# Patient Record
Sex: MALE | Race: WHITE | NOT HISPANIC OR LATINO | URBAN - NONMETROPOLITAN AREA
[De-identification: names, ages, dates, MRNs, and addresses within clinical notes are randomized per-mention and may not be internally consistent; named-entity substitution may affect disease eponyms.]

---

## 2023-09-01 ENCOUNTER — TRANSCRIBE ORDERS (OUTPATIENT)
Dept: ADMINISTRATIVE | Facility: HOSPITAL | Age: 38
End: 2023-09-01

## 2023-09-01 ENCOUNTER — LAB (OUTPATIENT)
Dept: LAB | Facility: HOSPITAL | Age: 38
End: 2023-09-01

## 2023-09-01 DIAGNOSIS — N46.9 MALE INFERTILITY, UNSPECIFIED: ICD-10-CM

## 2023-09-01 DIAGNOSIS — N46.9 MALE INFERTILITY, UNSPECIFIED: Primary | ICD-10-CM

## 2023-09-01 LAB
CHARACTER SMN: ABNORMAL
COLOR SMN: ABNORMAL
FORWARD PROGRESSION: ABNORMAL
ROUND CELLS, SEMINAL FLUID: ABNORMAL /HPF
SPECIMEN VOL SMN: 2.5 ML (ref 2–5)
SPERM # SMN: 22.5 MILLIONS/ML (ref 20–999)
SPERM MOTILE NFR SMN: 35 % MOTILE (ref 41–100)
SPERM PRECURSORS/100 SPERM: 2 /100 SPERM
SPERM SMN: 20 % NORMAL (ref 30–100)
VIABLE CELLS NFR SPEC: ABNORMAL %
VISC SMN: ABNORMAL CP
WBC/100 SPERM: 3 /100 SPERM

## 2023-09-01 PROCEDURE — 89320 SEMEN ANAL VOL/COUNT/MOT: CPT

## 2023-09-11 ENCOUNTER — OFFICE VISIT (OUTPATIENT)
Dept: UROLOGY | Facility: CLINIC | Age: 38
End: 2023-09-11

## 2023-09-11 VITALS
WEIGHT: 199.6 LBS | BODY MASS INDEX: 27.04 KG/M2 | DIASTOLIC BLOOD PRESSURE: 78 MMHG | HEART RATE: 85 BPM | SYSTOLIC BLOOD PRESSURE: 128 MMHG | HEIGHT: 72 IN

## 2023-09-11 DIAGNOSIS — N46.9 INFERTILITY MALE: Primary | ICD-10-CM

## 2023-09-11 PROCEDURE — 83002 ASSAY OF GONADOTROPIN (LH): CPT

## 2023-09-11 PROCEDURE — 84403 ASSAY OF TOTAL TESTOSTERONE: CPT

## 2023-09-11 PROCEDURE — 99203 OFFICE O/P NEW LOW 30 MIN: CPT

## 2023-09-11 PROCEDURE — 83001 ASSAY OF GONADOTROPIN (FSH): CPT

## 2023-09-11 PROCEDURE — 1160F RVW MEDS BY RX/DR IN RCRD: CPT

## 2023-09-11 PROCEDURE — 1159F MED LIST DOCD IN RCRD: CPT

## 2023-09-11 RX ORDER — BUPRENORPHINE HYDROCHLORIDE AND NALOXONE HYDROCHLORIDE DIHYDRATE 8; 2 MG/1; MG/1
1 TABLET SUBLINGUAL DAILY
COMMUNITY

## 2023-09-11 NOTE — PROGRESS NOTES
"Chief Complaint:    Chief Complaint   Patient presents with    Fertility        Vital Signs:   /78 (BP Location: Right arm, Patient Position: Sitting)   Pulse 85   Ht 182.9 cm (72\")   Wt 90.5 kg (199 lb 9.6 oz)   BMI 27.07 kg/m²   Body mass index is 27.07 kg/m².      HPI:  Loco Reyes is a 37 y.o. male who presents today for initial evaluation     History of Present Illness  Mr. Reyes presents to the clinic for initial evaluation of fertility issues.  He has a past medical history for polysubstance drug abuse and is currently on Suboxone daily.  He has never fathered any children in the past.  He reports he had a testosterone completed roughly a month ago that was low at around 150.  He had a recent semen analysis on 9/8/2023 that showed a total sperm count around 22 million.  He also had decreased viscosity, motility, and sperm morphology.  Physical exam today is normal.  He denies any headaches, blurry vision, double vision, vision changes, dry ejaculation, or other abnormalities.  He and his partner report they have been trying to conceive for roughly 1 year with no success.  She has been worked up and states she was cleared by her OB/GYN.    Past Medical History:  History reviewed. No pertinent past medical history.    Current Meds:  Current Outpatient Medications   Medication Sig Dispense Refill    buprenorphine-naloxone (SUBOXONE) 8-2 MG per SL tablet Place 1 tablet under the tongue Daily.       No current facility-administered medications for this visit.        Allergies:   Allergies   Allergen Reactions    Penicillins Other (See Comments)     Pt is unaware of allergic react         Past Surgical History:  History reviewed. No pertinent surgical history.    Social History:  Social History     Socioeconomic History    Marital status: Unknown   Tobacco Use    Smoking status: Every Day     Packs/day: 0.50     Types: Cigarettes    Smokeless tobacco: Current   Vaping Use    Vaping Use: Never used "   Substance and Sexual Activity    Alcohol use: Never    Drug use: Never    Sexual activity: Never       Family History:  Family History   Problem Relation Age of Onset    Hypertension Father        Review of Systems:  Review of Systems   Constitutional:  Negative for chills, fatigue and fever.   HENT: Negative.     Eyes: Negative.    Respiratory:  Negative for cough, shortness of breath and wheezing.    Cardiovascular:  Negative for leg swelling.   Gastrointestinal:  Negative for abdominal pain, nausea and vomiting.   Endocrine: Negative.    Genitourinary:  Negative for difficulty urinating, dysuria, frequency, penile swelling, scrotal swelling, testicular pain and urgency.   Musculoskeletal:  Negative for back pain and joint swelling.   Skin: Negative.    Allergic/Immunologic: Negative.    Neurological:  Negative for dizziness and headaches.   Hematological: Negative.    Psychiatric/Behavioral:  Negative for confusion.      Physical Exam:  Physical Exam  Constitutional:       General: He is not in acute distress.     Appearance: Normal appearance.   HENT:      Head: Normocephalic and atraumatic.      Nose: Nose normal.      Mouth/Throat:      Mouth: Mucous membranes are moist.   Eyes:      Conjunctiva/sclera: Conjunctivae normal.   Cardiovascular:      Rate and Rhythm: Normal rate and regular rhythm.      Pulses: Normal pulses.      Heart sounds: Normal heart sounds.   Pulmonary:      Effort: Pulmonary effort is normal.      Breath sounds: Normal breath sounds.   Abdominal:      General: Bowel sounds are normal.      Palpations: Abdomen is soft.   Genitourinary:     Penis: Normal.       Testes: Normal.   Musculoskeletal:         General: Normal range of motion.      Cervical back: Normal range of motion.   Skin:     General: Skin is warm.   Neurological:      General: No focal deficit present.      Mental Status: He is alert and oriented to person, place, and time.   Psychiatric:         Mood and Affect: Mood  normal.         Behavior: Behavior normal.         Thought Content: Thought content normal.         Judgment: Judgment normal.               Recent Image (CT and/or KUB):   CT Abdomen and Pelvis: No results found for this or any previous visit.     CT Stone Protocol: No results found for this or any previous visit.     KUB: No results found for this or any previous visit.       Labs:  Brief Urine Lab Results       None          Lab on 09/01/2023   Component Date Value Ref Range Status    Semen Color 09/01/2023 Straw (A)  White, Joel Final    Volume, Semen 09/01/2023 2.5  2.0 - 5.0 mL Final    Viscosity 09/01/2023 Minor (A)  Normal Final    Motility 09/01/2023 35 (L)  41 - 100 % Motile Final    Forward Progression 09/01/2023 2.5 (Normal) The majority of the motile sperm have good, normal rapid forward progression.   Final    Viability 09/01/2023    Final    Not performed per protocol    Sperm Morphology 09/01/2023 20.0 (L)  30.0 - 100.0 % Normal Final    Sperm, Total Count 09/01/2023 22.5  20.0 - 999.0 Millions/mL Final    Round Cells, Seminal Fluid 09/01/2023 0-7  0 - 7 /HPF Final    WBC/100 sperm 09/01/2023 3  /100 Sperm Final    Sperm Precursors/100 sperm 09/01/2023 2  /100 Sperm Final    Character, Semen 09/01/2023 Translucent but turbid  Translucent but turbid Final        Procedure: None  Procedures     I have reviewed and agree with the above PMH, PSH, FMH, social history, medications, allergies, and labs.     Assessment/Plan:   Problem List Items Addressed This Visit          Genitourinary and Reproductive     Infertility male - Primary    Relevant Orders    Follicle Stimulating Hormone    Luteinizing Hormone    Testosterone       Health Maintenance:   Health Maintenance Due   Topic Date Due    BMI FOLLOWUP  Never done    COVID-19 Vaccine (1) Never done    Pneumococcal Vaccine 0-64 (1 - PCV) Never done    TDAP/TD VACCINES (1 - Tdap) Never done    HEPATITIS C SCREENING  Never done    ANNUAL PHYSICAL  Never  done        Smoking Counseling: Current everyday smoker.  Current user of smokeless tobacco.  Counseling given however not ready to quit at this time.    Urine Incontinence: Patient reports that he is not currently experiencing any symptoms of urinary incontinence.    Patient was given instructions and counseling regarding his condition or for health maintenance advice. Please see specific information pulled into the AVS if appropriate.    Patient Education:   Male infertility -discussed with the patient the pathophysiology of male infertility.  Discussed causes which can include but are not limited to primary versus secondary hypogonadism.  Discussed work-up including lab test results for testosterone, FSH, LH, and other hormone/blood work.  Did discuss treatments which can include medications versus therapy such as in vitro fertilization.  Given patient's normal sperm count with low motility and viscosity and recommending to increase water and fruits and vegetables daily.  Also discussed with the patient using Mucinex low-dose once daily for spur motility and viscosity.  Discussed the risk and benefits of this medication.  Also discussed the use of Clomid and off label setting to help treat low testosterone.  Discussed the risk and benefits of this medication.  Advised him he may experience hot flashes, mood swings, breast tenderness/enlargement, abdominal cramps, and night sweats.  This time I will obtain FSH, LH, and testosterone.  I will call patient with results.  If test results show low testosterone we will start him on Clomid or obtain an MRI if he has abnormal FSH and LH.  Otherwise we will see the patient back in roughly 3 months for repeat lab work.  Patient verbalized understanding and agreed to plan of care.    Visit Diagnoses:    ICD-10-CM ICD-9-CM   1. Infertility male  N46.9 606.9       Meds Ordered During Visit:  No orders of the defined types were placed in this encounter.      Follow Up  Appointment: 3 months  No follow-ups on file.      This document has been electronically signed by Ash Nice PA-C   September 11, 2023 15:18 EDT    Part of this note may be an electronic transcription/translation of spoken language to printed text using the Dragon Dictation System.

## 2023-09-12 ENCOUNTER — TELEPHONE (OUTPATIENT)
Dept: UROLOGY | Facility: CLINIC | Age: 38
End: 2023-09-12

## 2023-09-12 DIAGNOSIS — N46.9 INFERTILITY MALE: Primary | ICD-10-CM

## 2023-09-12 LAB
FSH SERPL-ACNC: 2.17 MIU/ML
LH SERPL-ACNC: 2.12 MIU/ML
TESTOST SERPL-MCNC: 227 NG/DL (ref 249–836)

## 2023-09-12 NOTE — TELEPHONE ENCOUNTER
Tried to call Mr. Reyes however no one answered and voicemail box was not set up.  I did go ahead and send in Clomid 50 mg once daily for his low testosterone and infertility.

## 2023-09-14 ENCOUNTER — TELEPHONE (OUTPATIENT)
Dept: UROLOGY | Facility: CLINIC | Age: 38
End: 2023-09-14

## 2023-09-14 ENCOUNTER — PATIENT ROUNDING (BHMG ONLY) (OUTPATIENT)
Dept: UROLOGY | Facility: CLINIC | Age: 38
End: 2023-09-14

## 2023-09-14 NOTE — TELEPHONE ENCOUNTER
Caller: Loco Reyes    Relationship: Self    Best call back number: [phone]    What medications are you currently taking:   Current Outpatient Medications on File Prior to Visit   Medication Sig Dispense Refill    buprenorphine-naloxone (SUBOXONE) 8-2 MG per SL tablet Place 1 tablet under the tongue Daily.      clomiPHENE (CLOMID) 50 MG tablet Take 1 tablet by mouth Daily. 30 tablet 2     No current facility-administered medications on file prior to visit.        When did you start taking these medications:  2 DAYS    Which medication are you concerned about: CLOMID 50 MG TABLET DAILY    Who prescribed you this medication: AJIT MONDRAGON    What are your concerns: RECEIVED A CALL FROM ЕКАТЕРИНА MENDEZ. ON THE  VERBAL. PT DID NOT CHECK OFF WHAT INFORMATION  CAN RELEASE TO ЕКАТЕРИНА MENDEZ ( VERBAL NEEDS TO BE UPDATED). SHE CALLED BECAUSE THE MEDICATION CLOMID 50 MG DAILY IS CAUSING HIM DIZZINESS AND WANTED TO KNOW IF HE CAN TAKE HALF A DAY?     How long have you had these concerns:

## 2024-01-22 ENCOUNTER — TELEPHONE (OUTPATIENT)
Dept: UROLOGY | Facility: CLINIC | Age: 39
End: 2024-01-22

## 2024-01-22 DIAGNOSIS — N46.9 INFERTILITY MALE: ICD-10-CM

## 2024-01-23 RX ORDER — TRIAMCINOLONE ACETONIDE 1 MG/G
50 CREAM TOPICAL DAILY
Qty: 30 TABLET | Refills: 0 | OUTPATIENT
Start: 2024-01-23

## 2024-02-09 ENCOUNTER — OFFICE VISIT (OUTPATIENT)
Dept: UROLOGY | Facility: CLINIC | Age: 39
End: 2024-02-09
Payer: COMMERCIAL

## 2024-02-09 VITALS
HEIGHT: 72 IN | BODY MASS INDEX: 28.39 KG/M2 | SYSTOLIC BLOOD PRESSURE: 151 MMHG | DIASTOLIC BLOOD PRESSURE: 92 MMHG | WEIGHT: 209.6 LBS | HEART RATE: 87 BPM

## 2024-02-09 DIAGNOSIS — N46.9 INFERTILITY MALE: Primary | ICD-10-CM

## 2024-02-09 DIAGNOSIS — E34.8 HYPERESTROGENISM IN MALE: ICD-10-CM

## 2024-02-09 DIAGNOSIS — K59.04 CHRONIC IDIOPATHIC CONSTIPATION: ICD-10-CM

## 2024-02-09 PROCEDURE — 82670 ASSAY OF TOTAL ESTRADIOL: CPT

## 2024-02-09 PROCEDURE — 84403 ASSAY OF TOTAL TESTOSTERONE: CPT

## 2024-02-09 RX ORDER — DOCUSATE SODIUM 100 MG/1
100 CAPSULE, LIQUID FILLED ORAL 2 TIMES DAILY
Qty: 60 CAPSULE | Refills: 1 | Status: SHIPPED | OUTPATIENT
Start: 2024-02-09

## 2024-02-09 NOTE — PROGRESS NOTES
"Chief Complaint:    Chief Complaint   Patient presents with    Infertility     Follow up        Vital Signs:   /92   Pulse 87   Ht 182.9 cm (72\")   Wt 95.1 kg (209 lb 9.6 oz)   BMI 28.43 kg/m²   Body mass index is 28.43 kg/m².      HPI:  Loco Reyes is a 38 y.o. male who presents today for follow up    History of Present Illness  Mr. Corado presents to the clinic with his significant other for evaluation of infertility.  Patient has a past medical history significant for substance abuse and is currently on Suboxone.  They have been trying to conceive for several years now with no success.  Last office visit patient had a testosterone that was significantly low at 227.  He had a LH and FSH that came back normal.  Semen analysis completed at last office visit showed roughly 22 million sperm with a decreased motility and morphology.  Patient was started on Clomid 50 mg daily and states he had an overall improvement in symptoms consistent with low testosterone.  Patient reports an improvement in sex drive, fatigue, erections, and states he has had some weight loss.  Patient states that has not had his medication in roughly 2 months due to a lapse in insurance.  He wishes to try to restart Clomid at this time.  I will check a testosterone and estradiol in office and have the patient repeat his semen analysis.  Infertility  Pertinent negatives include no abdominal pain, chest pain, chills, congestion, fatigue, fever, headaches, nausea, neck pain, rash or vomiting.       Past Medical History:  No past medical history on file.    Current Meds:  Current Outpatient Medications   Medication Sig Dispense Refill    buprenorphine-naloxone (SUBOXONE) 8-2 MG per SL tablet Place 1 tablet under the tongue Daily.      clomiPHENE (CLOMID) 50 MG tablet Take 1 tablet by mouth Daily. 30 tablet 3    docusate sodium (Colace) 100 MG capsule Take 1 capsule by mouth 2 (Two) Times a Day. 60 capsule 1     No current " facility-administered medications for this visit.        Allergies:   Allergies   Allergen Reactions    Penicillins Other (See Comments)     Pt is unaware of allergic react         Past Surgical History:  No past surgical history on file.    Social History:  Social History     Socioeconomic History    Marital status: Significant Other   Tobacco Use    Smoking status: Every Day     Packs/day: .5     Types: Cigarettes    Smokeless tobacco: Current   Vaping Use    Vaping Use: Never used   Substance and Sexual Activity    Alcohol use: Never    Drug use: Never    Sexual activity: Never       Family History:  Family History   Problem Relation Age of Onset    Hypertension Father        Review of Systems:  Review of Systems   Constitutional:  Negative for chills, fatigue, fever and unexpected weight change.   HENT:  Negative for congestion and sinus pressure.    Respiratory:  Negative for chest tightness and shortness of breath.    Cardiovascular:  Negative for chest pain.   Gastrointestinal:  Negative for abdominal pain, constipation, diarrhea, nausea and vomiting.   Genitourinary:  Negative for difficulty urinating, dysuria, flank pain, frequency, hematuria and urgency.   Musculoskeletal:  Positive for back pain. Negative for neck pain.   Skin:  Negative for rash.   Neurological:  Negative for dizziness and headaches.   Hematological:  Does not bruise/bleed easily.   Psychiatric/Behavioral:  Negative for confusion and suicidal ideas. The patient is not nervous/anxious.        Physical Exam:  Physical Exam  Constitutional:       General: He is not in acute distress.     Appearance: Normal appearance.   HENT:      Head: Normocephalic and atraumatic.      Nose: Nose normal.      Mouth/Throat:      Mouth: Mucous membranes are moist.   Eyes:      Conjunctiva/sclera: Conjunctivae normal.   Cardiovascular:      Rate and Rhythm: Normal rate and regular rhythm.      Pulses: Normal pulses.      Heart sounds: Normal heart sounds.    Pulmonary:      Effort: Pulmonary effort is normal.      Breath sounds: Normal breath sounds.   Abdominal:      General: Bowel sounds are normal.      Palpations: Abdomen is soft.   Genitourinary:     Penis: Normal.       Testes: Normal.   Musculoskeletal:         General: Normal range of motion.      Cervical back: Normal range of motion.   Skin:     General: Skin is warm.   Neurological:      General: No focal deficit present.      Mental Status: He is alert and oriented to person, place, and time.   Psychiatric:         Mood and Affect: Mood normal.         Behavior: Behavior normal.         Thought Content: Thought content normal.         Judgment: Judgment normal.        Recent Image (CT and/or KUB):   CT Abdomen and Pelvis: No results found for this or any previous visit.     CT Stone Protocol: No results found for this or any previous visit.     KUB: No results found for this or any previous visit.       Labs:  Brief Urine Lab Results       None          No visits with results within 3 Month(s) from this visit.   Latest known visit with results is:   Office Visit on 09/11/2023   Component Date Value Ref Range Status    FSH 09/11/2023 2.17  mIU/mL Final    LH 09/11/2023 2.12  mIU/mL Final    Testosterone, Total 09/11/2023 227.00 (L)  249.00 - 836.00 ng/dL Final        Procedure: None  Procedures     I have reviewed and agree with the above PMH, PSH, FMH, social history, medications, allergies, and labs.     Assessment/Plan:   Problem List Items Addressed This Visit          Genitourinary and Reproductive     Infertility male - Primary    Relevant Medications    clomiPHENE (CLOMID) 50 MG tablet    Other Relevant Orders    Testosterone    Semen Analysis - Semen, Voided     Other Visit Diagnoses       Hyperestrogenism in male        Relevant Orders    Estradiol    Chronic idiopathic constipation        Relevant Medications    docusate sodium (Colace) 100 MG capsule            Health Maintenance:   Health  Maintenance Due   Topic Date Due    BMI FOLLOWUP  Never done    COVID-19 Vaccine (1) Never done    Pneumococcal Vaccine 0-64 (1 of 2 - PCV) Never done    TDAP/TD VACCINES (1 - Tdap) Never done    INFLUENZA VACCINE  Never done    HEPATITIS C SCREENING  Never done    ANNUAL PHYSICAL  Never done        Smoking Counseling: Everyday smoker.  Current user of smokeless tobacco.  Counseling given however not ready to quit at this time.    Urine Incontinence: Patient reports that he is not currently experiencing any symptoms of urinary incontinence.    Patient was given instructions and counseling regarding his condition or for health maintenance advice. Please see specific information pulled into the AVS if appropriate.    Patient Education:   Male infertility -patient had a low testosterone and abnormal semen analysis at the last office visit.  He was started on Clomid with improvement in symptoms.  He wishes to continue medication at this time.  I did discuss the risk and benefits of Clomid.  Vies patient this is an off label setting and not FDA approved.  Did discuss the uses of other conservative treatment methods such as increasing water intake 2 to 3 L/day to help with viscosity and volume.  Advised patient to also start Mucinex to help with motility, viability, and morphology.  Discussed the risk and benefits of this as well.  I would like to complete a testosterone and estradiol in office today.  Will have the patient complete another semen analysis is soon as possible.  Otherwise we will refill the patient's Clomid and see him back in 3 months for reevaluation.  Hyperestrogenism and male -discussed with the patient the pathophysiology of this condition.  I discussed the risk of Clomid and increased estrogen levels leading to side effects such as but not limited to hot flashes, night sweats, mood swings, abdominal cramps, lower urinary tract symptoms, and breast tenderness/enlargement.  Discussed the workup  including an estradiol.  Will obtain 1 in office today.  I discussed the use of Arimidex and off label setting which is not FDA approved.  Discussed the risk and benefits of this as well.  I will call patient with lab results once available.  Idiopathic constipation -patient does complain of constipation recommend starting a stool softener up to 2 times daily.  Otherwise we will see him back in 3 months for reevaluation    Visit Diagnoses:    ICD-10-CM ICD-9-CM   1. Infertility male  N46.9 606.9   2. Hyperestrogenism in male  E34.8 259.8   3. Chronic idiopathic constipation  K59.04 564.00       Meds Ordered During Visit:  New Medications Ordered This Visit   Medications    clomiPHENE (CLOMID) 50 MG tablet     Sig: Take 1 tablet by mouth Daily.     Dispense:  30 tablet     Refill:  3    docusate sodium (Colace) 100 MG capsule     Sig: Take 1 capsule by mouth 2 (Two) Times a Day.     Dispense:  60 capsule     Refill:  1       Follow Up Appointment: 3 months  No follow-ups on file.      This document has been electronically signed by Ash Nice PA-C   February 9, 2024 17:02 EST    Part of this note may be an electronic transcription/translation of spoken language to printed text using the Dragon Dictation System.

## 2024-02-10 LAB
ESTRADIOL SERPL HS-MCNC: 23 PG/ML
TESTOST SERPL-MCNC: 439 NG/DL (ref 249–836)

## 2024-02-12 ENCOUNTER — TELEPHONE (OUTPATIENT)
Dept: UROLOGY | Facility: CLINIC | Age: 39
End: 2024-02-12
Payer: COMMERCIAL

## 2024-02-13 ENCOUNTER — OFFICE VISIT (OUTPATIENT)
Dept: PSYCHIATRY | Facility: CLINIC | Age: 39
End: 2024-02-13
Payer: COMMERCIAL

## 2024-02-13 VITALS
BODY MASS INDEX: 28.42 KG/M2 | HEART RATE: 85 BPM | DIASTOLIC BLOOD PRESSURE: 85 MMHG | WEIGHT: 209.8 LBS | SYSTOLIC BLOOD PRESSURE: 137 MMHG | HEIGHT: 72 IN

## 2024-02-13 DIAGNOSIS — Z79.899 MEDICATION MANAGEMENT: ICD-10-CM

## 2024-02-13 DIAGNOSIS — F11.20 OPIOID TYPE DEPENDENCE, CONTINUOUS: Primary | ICD-10-CM

## 2024-02-13 LAB
EXTERNAL AMPHETAMINE SCREEN URINE: NEGATIVE
EXTERNAL BENZODIAZEPINE SCREEN URINE: NEGATIVE
EXTERNAL BUPRENORPHINE SCREEN URINE: POSITIVE
EXTERNAL COCAINE SCREEN URINE: NEGATIVE
EXTERNAL MDMA: NEGATIVE
EXTERNAL METHADONE SCREEN URINE: NEGATIVE
EXTERNAL METHAMPHETAMINE SCREEN URINE: NEGATIVE
EXTERNAL OPIATES SCREEN URINE: NEGATIVE
EXTERNAL OXYCODONE SCREEN URINE: NEGATIVE
EXTERNAL THC SCREEN URINE: NEGATIVE

## 2024-02-13 RX ORDER — NALOXONE HYDROCHLORIDE 4 MG/.1ML
1 SPRAY NASAL AS NEEDED
Qty: 2 EACH | Refills: 2 | Status: SHIPPED | OUTPATIENT
Start: 2024-02-13

## 2024-02-13 RX ORDER — BUPRENORPHINE HYDROCHLORIDE AND NALOXONE HYDROCHLORIDE DIHYDRATE 8; 2 MG/1; MG/1
2 TABLET SUBLINGUAL DAILY
Qty: 28 TABLET | Refills: 0 | Status: SHIPPED | OUTPATIENT
Start: 2024-02-13

## 2025-02-28 ENCOUNTER — LAB (OUTPATIENT)
Dept: LAB | Facility: HOSPITAL | Age: 40
End: 2025-02-28
Payer: COMMERCIAL

## 2025-02-28 ENCOUNTER — TRANSCRIBE ORDERS (OUTPATIENT)
Dept: ADMINISTRATIVE | Facility: HOSPITAL | Age: 40
End: 2025-02-28
Payer: COMMERCIAL

## 2025-02-28 DIAGNOSIS — Z20.2 VENEREAL DISEASE CONTACT: ICD-10-CM

## 2025-02-28 DIAGNOSIS — F19.99 DRUG-INDUCED MENTAL DISORDER: Primary | ICD-10-CM

## 2025-02-28 DIAGNOSIS — Z20.5 EXPOSURE TO HEPATITIS B: ICD-10-CM

## 2025-02-28 DIAGNOSIS — F19.99 DRUG-INDUCED MENTAL DISORDER: ICD-10-CM

## 2025-02-28 DIAGNOSIS — Z01.89 DIAGNOSTIC SKIN AND SENSITIZATION TESTS: ICD-10-CM

## 2025-02-28 LAB
C TRACH RRNA CVX QL NAA+PROBE: NOT DETECTED
N GONORRHOEA RRNA SPEC QL NAA+PROBE: NOT DETECTED
TRICHOMONAS VAGINALIS PCR: NOT DETECTED

## 2025-02-28 PROCEDURE — 87661 TRICHOMONAS VAGINALIS AMPLIF: CPT

## 2025-02-28 PROCEDURE — 87522 HEPATITIS C REVRS TRNSCRPJ: CPT

## 2025-02-28 PROCEDURE — G0432 EIA HIV-1/HIV-2 SCREEN: HCPCS

## 2025-02-28 PROCEDURE — 87591 N.GONORRHOEAE DNA AMP PROB: CPT

## 2025-02-28 PROCEDURE — 86592 SYPHILIS TEST NON-TREP QUAL: CPT

## 2025-02-28 PROCEDURE — 86708 HEPATITIS A ANTIBODY: CPT

## 2025-02-28 PROCEDURE — 86706 HEP B SURFACE ANTIBODY: CPT

## 2025-02-28 PROCEDURE — 85025 COMPLETE CBC W/AUTO DIFF WBC: CPT

## 2025-02-28 PROCEDURE — 87491 CHLMYD TRACH DNA AMP PROBE: CPT

## 2025-02-28 PROCEDURE — 80076 HEPATIC FUNCTION PANEL: CPT

## 2025-02-28 PROCEDURE — 80048 BASIC METABOLIC PNL TOTAL CA: CPT

## 2025-02-28 PROCEDURE — 87902 NFCT AGT GNTYP ALYS HEP C: CPT

## 2025-02-28 PROCEDURE — 36415 COLL VENOUS BLD VENIPUNCTURE: CPT

## 2025-02-28 PROCEDURE — 80074 ACUTE HEPATITIS PANEL: CPT

## 2025-02-28 PROCEDURE — 86480 TB TEST CELL IMMUN MEASURE: CPT

## 2025-03-01 DIAGNOSIS — N46.9 INFERTILITY MALE: ICD-10-CM

## 2025-03-01 LAB
ALBUMIN SERPL-MCNC: 4.5 G/DL (ref 3.5–5.2)
ALP SERPL-CCNC: 57 U/L (ref 39–117)
ALT SERPL W P-5'-P-CCNC: 12 U/L (ref 1–41)
ANION GAP SERPL CALCULATED.3IONS-SCNC: 9.2 MMOL/L (ref 5–15)
AST SERPL-CCNC: 20 U/L (ref 1–40)
BASOPHILS # BLD AUTO: 0.04 10*3/MM3 (ref 0–0.2)
BASOPHILS NFR BLD AUTO: 0.7 % (ref 0–1.5)
BILIRUB CONJ SERPL-MCNC: <0.1 MG/DL (ref 0–0.3)
BILIRUB INDIRECT SERPL-MCNC: NORMAL MG/DL
BILIRUB SERPL-MCNC: 0.3 MG/DL (ref 0–1.2)
BUN SERPL-MCNC: 8 MG/DL (ref 6–20)
BUN/CREAT SERPL: 8.5 (ref 7–25)
CALCIUM SPEC-SCNC: 9.2 MG/DL (ref 8.6–10.5)
CHLORIDE SERPL-SCNC: 104 MMOL/L (ref 98–107)
CO2 SERPL-SCNC: 28.8 MMOL/L (ref 22–29)
CREAT SERPL-MCNC: 0.94 MG/DL (ref 0.76–1.27)
DEPRECATED RDW RBC AUTO: 43.1 FL (ref 37–54)
EGFRCR SERPLBLD CKD-EPI 2021: 105.1 ML/MIN/1.73
EOSINOPHIL # BLD AUTO: 0.16 10*3/MM3 (ref 0–0.4)
EOSINOPHIL NFR BLD AUTO: 2.7 % (ref 0.3–6.2)
ERYTHROCYTE [DISTWIDTH] IN BLOOD BY AUTOMATED COUNT: 13.1 % (ref 12.3–15.4)
GLUCOSE SERPL-MCNC: 92 MG/DL (ref 65–99)
HAV IGM SERPL QL IA: NORMAL
HBV CORE IGM SERPL QL IA: NORMAL
HBV SURFACE AB SER RIA-ACNC: NORMAL
HBV SURFACE AG SERPL QL IA: NORMAL
HCT VFR BLD AUTO: 48.4 % (ref 37.5–51)
HCV AB SER QL: NORMAL
HGB BLD-MCNC: 16.6 G/DL (ref 13–17.7)
HIV 1+2 AB+HIV1 P24 AG SERPL QL IA: NORMAL
IMM GRANULOCYTES # BLD AUTO: 0.01 10*3/MM3 (ref 0–0.05)
IMM GRANULOCYTES NFR BLD AUTO: 0.2 % (ref 0–0.5)
LYMPHOCYTES # BLD AUTO: 2.08 10*3/MM3 (ref 0.7–3.1)
LYMPHOCYTES NFR BLD AUTO: 35.2 % (ref 19.6–45.3)
MCH RBC QN AUTO: 30.8 PG (ref 26.6–33)
MCHC RBC AUTO-ENTMCNC: 34.3 G/DL (ref 31.5–35.7)
MCV RBC AUTO: 89.8 FL (ref 79–97)
MONOCYTES # BLD AUTO: 0.45 10*3/MM3 (ref 0.1–0.9)
MONOCYTES NFR BLD AUTO: 7.6 % (ref 5–12)
NEUTROPHILS NFR BLD AUTO: 3.17 10*3/MM3 (ref 1.7–7)
NEUTROPHILS NFR BLD AUTO: 53.6 % (ref 42.7–76)
NRBC BLD AUTO-RTO: 0 /100 WBC (ref 0–0.2)
PLATELET # BLD AUTO: 226 10*3/MM3 (ref 140–450)
PMV BLD AUTO: 11.5 FL (ref 6–12)
POTASSIUM SERPL-SCNC: 4.1 MMOL/L (ref 3.5–5.2)
PROT SERPL-MCNC: 7.1 G/DL (ref 6–8.5)
RBC # BLD AUTO: 5.39 10*6/MM3 (ref 4.14–5.8)
RPR SER QL: NORMAL
SODIUM SERPL-SCNC: 142 MMOL/L (ref 136–145)
WBC NRBC COR # BLD AUTO: 5.91 10*3/MM3 (ref 3.4–10.8)

## 2025-03-02 LAB — HAV AB SER QL IA: POSITIVE

## 2025-03-03 LAB
GAMMA INTERFERON BACKGROUND BLD IA-ACNC: 0 IU/ML
HCV RNA SERPL NAA+PROBE-ACNC: NORMAL IU/ML
M TB IFN-G BLD-IMP: NEGATIVE
M TB IFN-G CD4+ BCKGRND COR BLD-ACNC: 0 IU/ML
M TB IFN-G CD4+CD8+ BCKGRND COR BLD-ACNC: 0.01 IU/ML
MITOGEN IGNF BCKGRD COR BLD-ACNC: >10 IU/ML
QUANTIFERON INCUBATION: NORMAL
SERVICE CMNT-IMP: NORMAL
TEST INFORMATION: NORMAL

## 2025-03-03 RX ORDER — TRIAMCINOLONE ACETONIDE 1 MG/G
50 CREAM TOPICAL DAILY
Qty: 30 TABLET | Refills: 0 | Status: SHIPPED | OUTPATIENT
Start: 2025-03-03

## 2025-03-05 LAB — HCV GENTYP SERPL NAA+PROBE: NORMAL

## 2025-05-27 ENCOUNTER — TELEMEDICINE (OUTPATIENT)
Dept: FAMILY MEDICINE CLINIC | Facility: TELEHEALTH | Age: 40
End: 2025-05-27
Payer: COMMERCIAL

## 2025-05-27 DIAGNOSIS — K08.89 PAIN, DENTAL: Primary | ICD-10-CM

## 2025-05-27 RX ORDER — CLINDAMYCIN HYDROCHLORIDE 300 MG/1
300 CAPSULE ORAL 3 TIMES DAILY
Qty: 21 CAPSULE | Refills: 0 | Status: SHIPPED | OUTPATIENT
Start: 2025-05-27 | End: 2025-06-03

## 2025-05-27 RX ORDER — NICOTINE 21 MG/24HR
PATCH, TRANSDERMAL 24 HOURS TRANSDERMAL
COMMUNITY
Start: 2025-04-16

## 2025-05-27 RX ORDER — BUPRENORPHINE AND NALOXONE 8; 2 MG/1; MG/1
FILM, SOLUBLE BUCCAL; SUBLINGUAL
COMMUNITY
Start: 2025-05-14

## 2025-05-27 NOTE — PROGRESS NOTES
Subjective   Chief Complaint   Patient presents with    Dental Pain       Loco Reyes is a 39 y.o. male.     History of Present Illness  Pt reports left upper dental pain and mild swelling of the cheek that started today. His tooth is broken.    Dental Pain   This is a new problem. The current episode started today. The problem has been unchanged. Associated symptoms include facial pain and thermal sensitivity. Pertinent negatives include no difficulty swallowing, fever, oral bleeding or sinus pressure.        Allergies   Allergen Reactions    Penicillins Other (See Comments)     Pt is unaware of allergic react        Past Medical History:   Diagnosis Date    Substance abuse        History reviewed. No pertinent surgical history.    Social History     Socioeconomic History    Marital status: Significant Other   Tobacco Use    Smoking status: Every Day     Current packs/day: 0.50     Types: Cigarettes    Smokeless tobacco: Current   Vaping Use    Vaping status: Never Used   Substance and Sexual Activity    Alcohol use: Never    Drug use: Not Currently     Types: Oxycodone    Sexual activity: Defer       Family History   Problem Relation Age of Onset    Hypertension Father          Current Outpatient Medications:     buprenorphine-naloxone (SUBOXONE) 8-2 MG film film, , Disp: , Rfl:     nicotine (NICODERM CQ) 21 MG/24HR patch, , Disp: , Rfl:     buprenorphine-naloxone (SUBOXONE) 8-2 MG per SL tablet, Place 2 tablets under the tongue Daily., Disp: 28 tablet, Rfl: 0    clindamycin (Cleocin) 300 MG capsule, Take 1 capsule by mouth 3 (Three) Times a Day for 7 days., Disp: 21 capsule, Rfl: 0    Clomid 50 MG tablet, Take 1 tablet by mouth once daily, Disp: 30 tablet, Rfl: 0    naloxone (NARCAN) 4 MG/0.1ML nasal spray, 1 spray into the nostril(s) as directed by provider As Needed for Opioid Reversal. 1 spray in 1 nostril every 2 to 3 minutes, call 911, Disp: 2 each, Rfl: 2      Review of Systems   Constitutional:  Negative  for chills, diaphoresis, fatigue and fever.   HENT:  Positive for dental problem and facial swelling. Negative for sinus pressure.         There were no vitals filed for this visit.    Objective   Physical Exam  Constitutional:       General: He is not in acute distress.     Appearance: Normal appearance. He is not ill-appearing, toxic-appearing or diaphoretic.   HENT:      Head: Normocephalic.      Jaw: Swelling (mild) present.        Mouth/Throat:      Lips: Pink.      Mouth: Mucous membranes are moist.      Dentition: Abnormal dentition. Dental tenderness present.     Pulmonary:      Effort: Pulmonary effort is normal.   Neurological:      Mental Status: He is alert and oriented to person, place, and time.          Procedures     Assessment & Plan   Diagnoses and all orders for this visit:    1. Pain, dental (Primary)  -     clindamycin (Cleocin) 300 MG capsule; Take 1 capsule by mouth 3 (Three) Times a Day for 7 days.  Dispense: 21 capsule; Refill: 0            PLAN: Discussed dosing, side effects, recommended other symptomatic care.  Patient should follow up with primary care provider, Urgent Care or ER if symptoms worsen, fail to resolve or other symptoms need attention. Patient/family agree to the above.         SHRADDHA Birch     Mode of Visit: Video  Location of patient: -HOME-  Location of provider: +HOME+  You have chosen to receive care through a telehealth visit.  The patient has signed the video visit consent form.  The visit included audio and video interaction. No technical issues occurred during this visit.    The use of a video visit has been reviewed with the patient and verbal informed consent has been obtained. Myself and Loco Reyes participated in this visit. The patient is located at [address] [place] [address]. I am located in [place], [place]. Mychart and Zoom were utilized.        This visit was performed via Telehealth.  This patient has been instructed to  follow-up with their primary care provider if their symptoms worsen or the treatment provided does not resolve their illness.

## 2025-08-19 DIAGNOSIS — N46.9 INFERTILITY MALE: ICD-10-CM

## 2025-08-20 DIAGNOSIS — E34.8 HYPERESTROGENISM IN MALE: Primary | ICD-10-CM

## 2025-08-20 DIAGNOSIS — N46.9 INFERTILITY MALE: ICD-10-CM

## 2025-08-20 RX ORDER — CLOMIPHENE CITRATE 50 MG/1
50 TABLET ORAL DAILY
Qty: 30 TABLET | Refills: 0 | Status: SHIPPED | OUTPATIENT
Start: 2025-08-20

## 2025-08-20 RX ORDER — TRIAMCINOLONE ACETONIDE 1 MG/G
50 CREAM TOPICAL DAILY
Qty: 10 TABLET | OUTPATIENT
Start: 2025-08-20